# Patient Record
Sex: MALE | Race: WHITE | NOT HISPANIC OR LATINO | Employment: FULL TIME | URBAN - METROPOLITAN AREA
[De-identification: names, ages, dates, MRNs, and addresses within clinical notes are randomized per-mention and may not be internally consistent; named-entity substitution may affect disease eponyms.]

---

## 2019-05-23 PROBLEM — J45.20 MILD INTERMITTENT ASTHMA WITHOUT COMPLICATION: Status: ACTIVE | Noted: 2019-05-23

## 2022-09-15 ENCOUNTER — OFFICE VISIT (OUTPATIENT)
Dept: URGENT CARE | Facility: PHYSICIAN GROUP | Age: 32
End: 2022-09-15

## 2022-09-15 VITALS
WEIGHT: 180 LBS | HEART RATE: 63 BPM | HEIGHT: 69 IN | OXYGEN SATURATION: 98 % | SYSTOLIC BLOOD PRESSURE: 110 MMHG | RESPIRATION RATE: 16 BRPM | BODY MASS INDEX: 26.66 KG/M2 | DIASTOLIC BLOOD PRESSURE: 70 MMHG | TEMPERATURE: 97.8 F

## 2022-09-15 DIAGNOSIS — J98.8 VIRAL RESPIRATORY INFECTION: ICD-10-CM

## 2022-09-15 DIAGNOSIS — B97.89 VIRAL RESPIRATORY INFECTION: ICD-10-CM

## 2022-09-15 DIAGNOSIS — J45.901 ASTHMA EXACERBATION, MILD: ICD-10-CM

## 2022-09-15 DIAGNOSIS — K04.7 DENTAL INFECTION: ICD-10-CM

## 2022-09-15 PROCEDURE — 99203 OFFICE O/P NEW LOW 30 MIN: CPT | Performed by: NURSE PRACTITIONER

## 2022-09-15 RX ORDER — AMOXICILLIN AND CLAVULANATE POTASSIUM 875; 125 MG/1; MG/1
1 TABLET, FILM COATED ORAL 2 TIMES DAILY
Qty: 14 TABLET | Refills: 0 | Status: SHIPPED | OUTPATIENT
Start: 2022-09-15 | End: 2022-09-22

## 2022-09-15 RX ORDER — PREDNISONE 20 MG/1
40 TABLET ORAL DAILY
Qty: 10 TABLET | Refills: 0 | Status: SHIPPED | OUTPATIENT
Start: 2022-09-15 | End: 2022-09-20

## 2022-09-15 ASSESSMENT — ENCOUNTER SYMPTOMS
VOMITING: 0
FEVER: 0
COUGH: 1
HEMOPTYSIS: 0
SHORTNESS OF BREATH: 0
SINUS PRESSURE: 1
WHEEZING: 0
SORE THROAT: 1
DIARRHEA: 0
NAUSEA: 0
SINUS PAIN: 1

## 2022-09-15 NOTE — PATIENT INSTRUCTIONS
Symptomatic care.  -Oral hydration and rest.   -Cough control: nonpharmacologic options for cough relief such as throat lozenges, hot tea, honey.  -Over the counter expectorant as directed; Guaifenesin (Mucinex).  -Tylenol or ibuprofen for pain and fever as directed.   -Warm salt water gargles.  -OTC Throat lozenges or spray (Cepacol).    Seek emergency medical care immediately for: Trouble breathing, persistent pain or pressure in the chest, confusion, inability to wake or stay awake, bluish lips or face, persistent tachycardia (fast heart rate), prolonged dizziness, persistent high grade fevers. Follow up for prolonged cough, persistent wheezing, persistent throat pain, difficulty swallowing, persistent fevers, leg swelling, or any other concerns. Follow up with your Primary Care Provider.     -Dental Hygiene  -Reduce sugar-rich foods or beverages.     Follow up with dentist. Follow up urgently for worsening symptoms or increased signs of infection (redness, pus, increased pain, increased swelling or fever). Emergently for swelling to neck or throat, difficulty swallowing, difficulty opening mouth, shortness of breath.

## 2022-09-15 NOTE — PROGRESS NOTES
Subjective:     Tc Hidalgo is a 31 y.o. male who presents for Sinusitis (Per patient has sinus infection for 3 days now. Needs antibiotic and note for work. )      Sinus symptms x 3 days. Mild sore throat. Had COVID 3 months ago. Taking Dayquil and tylenol. Others in the household have URI symptoms, and have tested negative for COVID. Also has lower dental pain, left wisdom tooth. Concerned for increased risk for sinus infections with his dental issues. Has an inhaler for his asthma.    Sinusitis  This is a new problem. The current episode started in the past 7 days. There has been no fever. Associated symptoms include congestion, coughing, sinus pressure and a sore throat. Pertinent negatives include no ear pain or shortness of breath.   Dental Pain   The pain is at a severity of 3/10. Associated symptoms include sinus pressure. Pertinent negatives include no fever.     Past Medical History:   Diagnosis Date   • Asthma        History reviewed. No pertinent surgical history.    Social History     Socioeconomic History   • Marital status: Single     Spouse name: Not on file   • Number of children: Not on file   • Years of education: Not on file   • Highest education level: Not on file   Occupational History   • Not on file   Tobacco Use   • Smoking status: Every Day     Packs/day: 0.00     Types: Cigarettes   • Smokeless tobacco: Never   • Tobacco comments:     4cig/day    Vaping Use   • Vaping Use: Never used   Substance and Sexual Activity   • Alcohol use: Yes   • Drug use: Yes     Types: Marijuana, Inhaled     Comment: cocaine, marijuana   • Sexual activity: Not on file   Other Topics Concern   • Not on file   Social History Narrative   • Not on file     Social Determinants of Health     Financial Resource Strain: Not on file   Food Insecurity: Not on file   Transportation Needs: Not on file   Physical Activity: Not on file   Stress: Not on file   Social Connections: Not on file   Intimate Partner  "Violence: Not on file   Housing Stability: Not on file        History reviewed. No pertinent family history.     No Known Allergies    Review of Systems   Constitutional:  Positive for malaise/fatigue. Negative for fever.   HENT:  Positive for congestion, sinus pressure, sinus pain and sore throat. Negative for ear pain.    Respiratory:  Positive for cough. Negative for hemoptysis, shortness of breath and wheezing.    Gastrointestinal:  Negative for diarrhea, nausea and vomiting.   Endo/Heme/Allergies:  Positive for environmental allergies.   All other systems reviewed and are negative.     Objective:   /70   Pulse 63   Temp 36.6 °C (97.8 °F) (Temporal)   Resp 16   Ht 1.74 m (5' 8.5\")   Wt 81.6 kg (180 lb)   SpO2 98%   BMI 26.97 kg/m²     Physical Exam  Vitals reviewed.   Constitutional:       General: He is not in acute distress.     Appearance: He is well-developed. He is ill-appearing. He is not toxic-appearing.   HENT:      Head: Normocephalic and atraumatic.      Right Ear: Tympanic membrane, ear canal and external ear normal.      Left Ear: Tympanic membrane, ear canal and external ear normal.      Nose: Mucosal edema and congestion present.      Mouth/Throat:      Lips: Pink.      Mouth: Mucous membranes are moist.      Dentition: Abnormal dentition. Dental tenderness present.      Pharynx: Oropharynx is clear.      Comments: Fractured lower left molar. Gum TTP.   Eyes:      Conjunctiva/sclera: Conjunctivae normal.   Cardiovascular:      Rate and Rhythm: Normal rate.   Pulmonary:      Effort: Pulmonary effort is normal. No accessory muscle usage, prolonged expiration, respiratory distress or retractions.      Breath sounds: No stridor. Wheezing present. No decreased breath sounds, rhonchi or rales.   Musculoskeletal:         General: Normal range of motion.      Cervical back: Full passive range of motion without pain, normal range of motion and neck supple. No crepitus.   Skin:     General: Skin " is warm and dry.      Findings: No rash.   Neurological:      General: No focal deficit present.      Mental Status: He is alert and oriented to person, place, and time.      GCS: GCS eye subscore is 4. GCS verbal subscore is 5. GCS motor subscore is 6.   Psychiatric:         Mood and Affect: Mood normal.         Speech: Speech normal.         Behavior: Behavior normal.         Thought Content: Thought content normal.         Judgment: Judgment normal.       Assessment/Plan:   1. Viral respiratory infection    2. Asthma exacerbation, mild  - predniSONE (DELTASONE) 20 MG Tab; Take 2 Tablets by mouth every day for 5 days.  Dispense: 10 Tablet; Refill: 0    3. Dental infection  - amoxicillin-clavulanate (AUGMENTIN) 875-125 MG Tab; Take 1 Tablet by mouth 2 times a day for 7 days.  Dispense: 14 Tablet; Refill: 0  Symptomatic care.  -Oral hydration and rest.   -Cough control: nonpharmacologic options for cough relief such as throat lozenges, hot tea, honey.  -Over the counter expectorant as directed; Guaifenesin (Mucinex).  -Tylenol or ibuprofen for pain and fever as directed.   -Warm salt water gargles.  -OTC Throat lozenges or spray (Cepacol).    Seek emergency medical care immediately for: Trouble breathing, persistent pain or pressure in the chest, confusion, inability to wake or stay awake, bluish lips or face, persistent tachycardia (fast heart rate), prolonged dizziness, persistent high grade fevers. Follow up for prolonged cough, persistent wheezing, persistent throat pain, difficulty swallowing, persistent fevers, leg swelling, or any other concerns. Follow up with your Primary Care Provider.     -Dental Hygiene  -Reduce sugar-rich foods or beverages.     Follow up with dentist. Follow up urgently for worsening symptoms or increased signs of infection (redness, pus, increased pain, increased swelling or fever). Emergently for swelling to neck or throat, difficulty swallowing, difficulty opening mouth, shortness  of breath.    -Discussed viral etiology of URI symptoms, along with smoke contributing to asthma. Stable Vitals. Had COVID 3 months ago. Patient to f/u on home COVID test.     Differential diagnosis, natural history, supportive care, and indications for immediate follow-up discussed.

## 2022-09-15 NOTE — LETTER
September 15, 2022         Patient: Tc Hidalgo   YOB: 1990   Date of Visit: 9/15/2022           To Whom it May Concern:    Tc Hidalgo was seen in my clinic on 9/15/2022. He may return to work on 9/17/2022, and long as COVID test is negative.     If you have any questions or concerns, please don't hesitate to call.        Sincerely,           TINO Newsome.  Electronically Signed

## 2023-05-13 ENCOUNTER — OFFICE VISIT (OUTPATIENT)
Dept: URGENT CARE | Facility: PHYSICIAN GROUP | Age: 33
End: 2023-05-13
Payer: COMMERCIAL

## 2023-05-13 VITALS
SYSTOLIC BLOOD PRESSURE: 118 MMHG | WEIGHT: 180 LBS | OXYGEN SATURATION: 98 % | RESPIRATION RATE: 18 BRPM | TEMPERATURE: 98.1 F | HEART RATE: 76 BPM | HEIGHT: 69 IN | BODY MASS INDEX: 26.66 KG/M2 | DIASTOLIC BLOOD PRESSURE: 80 MMHG

## 2023-05-13 DIAGNOSIS — K08.89 TOOTH PAIN: ICD-10-CM

## 2023-05-13 PROCEDURE — 99213 OFFICE O/P EST LOW 20 MIN: CPT | Performed by: FAMILY MEDICINE

## 2023-05-13 PROCEDURE — 3074F SYST BP LT 130 MM HG: CPT | Performed by: FAMILY MEDICINE

## 2023-05-13 PROCEDURE — 3079F DIAST BP 80-89 MM HG: CPT | Performed by: FAMILY MEDICINE

## 2023-05-13 RX ORDER — IBUPROFEN 600 MG/1
600 TABLET ORAL EVERY 6 HOURS PRN
Qty: 30 TABLET | Refills: 0 | Status: SHIPPED | OUTPATIENT
Start: 2023-05-13 | End: 2023-05-23

## 2023-05-13 RX ORDER — AMOXICILLIN 875 MG/1
875 TABLET, COATED ORAL 2 TIMES DAILY
Qty: 20 TABLET | Refills: 0 | Status: SHIPPED | OUTPATIENT
Start: 2023-05-13 | End: 2023-05-23

## 2023-05-13 ASSESSMENT — ENCOUNTER SYMPTOMS: CONSTITUTIONAL NEGATIVE: 1

## 2023-05-13 NOTE — LETTER
[facility] [address] [id number]     May 13, 2023    Patient: Tc Hidalgo   YOB: 1990   Date of Visit: 5/13/2023       To Whom It May Concern:    Tc Hidalgo was seen and treated in our department on 5/13/2023. Please excuse Tc for the next two days for tooth pain.    Sincerely,     Lawrence Rodas M.D.

## 2023-05-13 NOTE — PROGRESS NOTES
"Subjective:   Tc Hidalgo is a 32 y.o. male who presents for Dental Injury (Scheduled for oral surgery on Tuesday . Tooth is swelling and broke in half, molar in left back . )      Dental Injury        Review of Systems   Constitutional: Negative.    HENT: Negative.          Tooth pain   Skin: Negative.        Medications, Allergies, and current problem list reviewed today in Epic.     Objective:     /80   Pulse 76   Temp 36.7 °C (98.1 °F) (Temporal)   Resp 18   Ht 1.753 m (5' 9\")   Wt 81.6 kg (180 lb)   SpO2 98%     Physical Exam  Vitals and nursing note reviewed.   Constitutional:       Appearance: Normal appearance.   HENT:      Head: Normocephalic and atraumatic.      Right Ear: Tympanic membrane normal.      Left Ear: Tympanic membrane normal.      Nose: Nose normal.      Mouth/Throat:      Comments: Broken teeth, with swollen, irritated gums  Cardiovascular:      Rate and Rhythm: Normal rate and regular rhythm.      Pulses: Normal pulses.      Heart sounds: Normal heart sounds.   Pulmonary:      Effort: Pulmonary effort is normal.      Breath sounds: Normal breath sounds.   Neurological:      Mental Status: He is alert.         Assessment/Plan:     Diagnosis and associated orders:     1. Tooth pain  amoxicillin (AMOXIL) 875 MG tablet    ibuprofen (MOTRIN) 600 MG Tab         Comments/MDM:              Differential diagnosis, natural history, supportive care, and indications for immediate follow-up discussed.    Advised the patient to follow-up with the primary care physician for recheck, reevaluation, and consideration of further management.    Please note that this dictation was created using voice recognition software. I have made a reasonable attempt to correct obvious errors, but I expect that there are errors of grammar and possibly content that I did not discover before finalizing the note.    This note was electronically signed by Lawrence Rodas M.D.  "

## 2023-10-05 ENCOUNTER — OFFICE VISIT (OUTPATIENT)
Dept: URGENT CARE | Facility: CLINIC | Age: 33
End: 2023-10-05
Payer: COMMERCIAL

## 2023-10-05 VITALS
SYSTOLIC BLOOD PRESSURE: 122 MMHG | OXYGEN SATURATION: 96 % | HEIGHT: 69 IN | TEMPERATURE: 98.3 F | BODY MASS INDEX: 26.12 KG/M2 | DIASTOLIC BLOOD PRESSURE: 82 MMHG | HEART RATE: 68 BPM | WEIGHT: 176.37 LBS | RESPIRATION RATE: 16 BRPM

## 2023-10-05 DIAGNOSIS — J45.21 MILD INTERMITTENT ASTHMA WITH EXACERBATION: ICD-10-CM

## 2023-10-05 PROCEDURE — 3079F DIAST BP 80-89 MM HG: CPT | Performed by: FAMILY MEDICINE

## 2023-10-05 PROCEDURE — 3074F SYST BP LT 130 MM HG: CPT | Performed by: FAMILY MEDICINE

## 2023-10-05 PROCEDURE — 99214 OFFICE O/P EST MOD 30 MIN: CPT | Performed by: FAMILY MEDICINE

## 2023-10-05 RX ORDER — IPRATROPIUM BROMIDE AND ALBUTEROL 20; 100 UG/1; UG/1
1 SPRAY, METERED RESPIRATORY (INHALATION) 4 TIMES DAILY
Qty: 1 EACH | Refills: 0 | Status: SHIPPED | OUTPATIENT
Start: 2023-10-05

## 2023-10-05 RX ORDER — PREDNISONE 20 MG/1
40 TABLET ORAL DAILY
Qty: 10 TABLET | Refills: 0 | Status: SHIPPED | OUTPATIENT
Start: 2023-10-05 | End: 2023-10-10

## 2023-10-05 RX ORDER — ALBUTEROL SULFATE 90 UG/1
2 AEROSOL, METERED RESPIRATORY (INHALATION) EVERY 4 HOURS PRN
Qty: 1 EACH | Refills: 0 | Status: SHIPPED | OUTPATIENT
Start: 2023-10-05 | End: 2023-10-05

## 2023-10-05 ASSESSMENT — ENCOUNTER SYMPTOMS
VOMITING: 0
EYE REDNESS: 0
EYE DISCHARGE: 0
MYALGIAS: 0
WEIGHT LOSS: 0
NAUSEA: 0

## 2023-10-05 NOTE — PROGRESS NOTES
"Subjective     Tc Hidalgo is a 32 y.o. male who presents with Asthma (Pt reports he had an asthma attack at work, however he has since recovered after receiving what he believes was Duoneb. )            Onset today shortness of breath and wheezing.  PMH asthma.  Triggered by environmental factors at work.  He is significantly proved after DuoNeb breathing treatment at his work.  No cough.  No fever.  No other aggravating alleviating factors.        Review of Systems   Constitutional:  Negative for malaise/fatigue and weight loss.   Eyes:  Negative for discharge and redness.   Gastrointestinal:  Negative for nausea and vomiting.   Musculoskeletal:  Negative for joint pain and myalgias.   Skin:  Negative for itching and rash.              Objective     /82   Pulse 68   Temp 36.8 °C (98.3 °F) (Temporal)   Resp 16   Ht 1.753 m (5' 9\")   Wt 80 kg (176 lb 5.9 oz)   SpO2 96%   BMI 26.05 kg/m²      Physical Exam  Constitutional:       General: He is not in acute distress.     Appearance: He is well-developed.   HENT:      Head: Normocephalic and atraumatic.   Eyes:      Conjunctiva/sclera: Conjunctivae normal.   Cardiovascular:      Rate and Rhythm: Normal rate and regular rhythm.      Heart sounds: Normal heart sounds. No murmur heard.  Pulmonary:      Effort: Pulmonary effort is normal.      Breath sounds: Normal breath sounds. No wheezing.   Skin:     General: Skin is warm and dry.      Findings: No rash.   Neurological:      Mental Status: He is alert.                             Assessment & Plan        1. Mild intermittent asthma with exacerbation    - ipratropium-albuterol (COMBIVENT RESPIMAT)  MCG/ACT Aero Soln; Inhale 1 Puff 4 times a day.  Dispense: 1 Each; Refill: 0  - albuterol 108 (90 Base) MCG/ACT Aero Soln inhalation aerosol; Inhale 2 Puffs every four hours as needed for Shortness of Breath.  Dispense: 1 Each; Refill: 0  - predniSONE (DELTASONE) 20 MG Tab; Take 2 Tablets by mouth " every day for 5 days.  Dispense: 10 Tablet; Refill: 0     Differential diagnosis, natural history, supportive care, and indications for immediate follow-up were discussed.

## 2023-10-05 NOTE — LETTER
October 5, 2023         Patient: Tc Hidalgo   YOB: 1990   Date of Visit: 10/5/2023           To Whom it May Concern:    Tc Hidalgo was seen in my clinic on 10/5/2023. Please excuse from work today.     Sincerely,           Jordin Sr M.D.  Electronically Signed

## 2023-10-11 ENCOUNTER — OCCUPATIONAL MEDICINE (OUTPATIENT)
Dept: URGENT CARE | Facility: PHYSICIAN GROUP | Age: 33
End: 2023-10-11
Payer: COMMERCIAL

## 2023-10-11 ENCOUNTER — OFFICE VISIT (OUTPATIENT)
Dept: URGENT CARE | Facility: PHYSICIAN GROUP | Age: 33
End: 2023-10-11
Payer: COMMERCIAL

## 2023-10-11 VITALS
BODY MASS INDEX: 26.19 KG/M2 | OXYGEN SATURATION: 95 % | RESPIRATION RATE: 16 BRPM | WEIGHT: 176.8 LBS | HEIGHT: 69 IN | DIASTOLIC BLOOD PRESSURE: 72 MMHG | TEMPERATURE: 98.2 F | HEART RATE: 61 BPM | SYSTOLIC BLOOD PRESSURE: 124 MMHG

## 2023-10-11 VITALS
BODY MASS INDEX: 26.14 KG/M2 | WEIGHT: 176.5 LBS | OXYGEN SATURATION: 95 % | SYSTOLIC BLOOD PRESSURE: 124 MMHG | HEIGHT: 69 IN | TEMPERATURE: 98.2 F | DIASTOLIC BLOOD PRESSURE: 72 MMHG | HEART RATE: 61 BPM | RESPIRATION RATE: 16 BRPM

## 2023-10-11 DIAGNOSIS — J45.41 MODERATE PERSISTENT ASTHMA WITH ACUTE EXACERBATION: ICD-10-CM

## 2023-10-11 PROCEDURE — 3074F SYST BP LT 130 MM HG: CPT | Performed by: NURSE PRACTITIONER

## 2023-10-11 PROCEDURE — 99214 OFFICE O/P EST MOD 30 MIN: CPT | Performed by: NURSE PRACTITIONER

## 2023-10-11 PROCEDURE — 3078F DIAST BP <80 MM HG: CPT | Performed by: NURSE PRACTITIONER

## 2023-10-11 RX ORDER — BUDESONIDE AND FORMOTEROL FUMARATE DIHYDRATE 80; 4.5 UG/1; UG/1
2 AEROSOL RESPIRATORY (INHALATION) 2 TIMES DAILY
Qty: 1 EACH | Refills: 0 | Status: SHIPPED | OUTPATIENT
Start: 2023-10-11

## 2023-10-11 RX ORDER — PREDNISONE 20 MG/1
40 TABLET ORAL DAILY
Qty: 10 TABLET | Refills: 0 | Status: SHIPPED | OUTPATIENT
Start: 2023-10-11 | End: 2023-10-16

## 2023-10-11 NOTE — PROGRESS NOTES
"Subjective     Tc Hidalgo is a 32 y.o. male who presents with Medication Problem (Pt was prescribed Combivent Respimat for daily use following an asthma attack. Pt reports difficulty breathing upon waking in the morning, unless this inhaler is used immediately. ) and Breathing Problem (Pt states his lungs feel like they're \"sticking\".)            HPI   last week he had an asthma attack at work. He went to [facility] and they gave him a breathing treatment. Usually he uses \"normal' kind but they added ipitropium bromide to breathing treatment. He was seen at [facility] and he was given combivent. It is is causing him constriction in his lungs. He has not used it today. It helps but when uses his lungs are worse.   He smokes cigarettes daily. Pt with asthma his entire life.     ROS           Objective     /72 (BP Location: Left arm, Patient Position: Sitting, BP Cuff Size: Adult long)   Pulse 61   Temp 36.8 °C (98.2 °F) (Temporal)   Resp 16   Ht 1.753 m (5' 9\")   Wt 80.1 kg (176 lb 8 oz)   SpO2 95%   BMI 26.06 kg/m²      Physical Exam                        Assessment & Plan        There are no diagnoses linked to this encounter.                "

## 2023-10-11 NOTE — LETTER
October 11, 2023       Patient: Tc Hidalgo   YOB: 1990   Date of Visit: 10/11/2023         To Whom It May Concern:    Tc Hidalgo was seen in urgent care for a medical problem today. Please excuse his work absence.           Sincerely,          THAO Jane.  Electronically Signed

## 2023-10-12 NOTE — PROGRESS NOTES
"Tc Hidalgo is a 32 y.o. male who presents for Medication Problem (Pt was prescribed Combivant Respimat for daily use following asthma attack. Pt reports difficulty breathing upon waking, unless the inhaler is used immediately. ) and Breathing Problem (Pt states his lungs feel like they're \"sticking\". )      HPI  This is a new problem. Tc Hidalgo is a 32 y.o. patient who presents to urgent care with c/o: Patient has a long history of asthma.  While at work the other day he threw away an alcohol wipe.  Shortly after that he developed an asthma attack that was triggered by environmental factors at his work.  He had been struggling with his asthma for a few days prior.  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  He reports he is never had that before.  He normally has albuterol when he has an asthma attack which alleviates his discomfort well.  Then he went to urgent care on October 5.  He was treated with prednisone as well as given a Combivent Respimat.  At first the Combivent seem to be helping him with his asthma was really bad but now he reports that he actually feels that his asthma is worse while using it.  Today he feels much better not using the Combivent.  He says he has a new prescription at his pharmacy for albuterol that he has to go .  He has concerns about what he should be taking to help his asthma and get it back under control.  He does smoke tobacco daily and knows that he needs to stop smoking.  He has cut down since he has been sick.  He denies cough, orthopnea, chest pain.  He has had no recent illness.  He has not been on any recent antibiotic.        ROS See HPI    Allergies:     No Known Allergies    PMSFS Hx:  Past Medical History:   Diagnosis Date    Asthma      History reviewed. No pertinent surgical history.  History reviewed. No pertinent family history.  Social History     Tobacco Use    Smoking " "status: Every Day     Current packs/day: 0.75     Types: Cigarettes    Smokeless tobacco: Never    Tobacco comments:     4cig/day    Substance Use Topics    Alcohol use: Yes       Problems:   Patient Active Problem List   Diagnosis    Mild intermittent asthma without complication       Medications:   Current Outpatient Medications on File Prior to Visit   Medication Sig Dispense Refill    ipratropium-albuterol (COMBIVENT RESPIMAT)  MCG/ACT Aero Soln Inhale 1 Puff 4 times a day. 1 Each 0    albuterol 108 (90 Base) MCG/ACT Aero Soln inhalation aerosol Inhale 2 Puffs by mouth every 6 hours as needed for Shortness of Breath. 8.5 g 0     No current facility-administered medications on file prior to visit.          Objective:     /72 (BP Location: Left arm, Patient Position: Sitting, BP Cuff Size: Adult long)   Pulse 61   Temp 36.8 °C (98.2 °F) (Temporal)   Resp 16   Ht 1.753 m (5' 9\")   Wt 80.2 kg (176 lb 12.8 oz)   SpO2 95%   BMI 26.11 kg/m²     Physical Exam  Vitals and nursing note reviewed.   Constitutional:       General: He is not in acute distress.     Appearance: Normal appearance. He is well-developed and well-groomed. He is not toxic-appearing.   Eyes:      General:         Right eye: No discharge.         Left eye: No discharge.   Cardiovascular:      Rate and Rhythm: Normal rate and regular rhythm.      Pulses: Normal pulses.      Heart sounds: Normal heart sounds.   Pulmonary:      Effort: Pulmonary effort is normal. No accessory muscle usage.      Breath sounds: Normal air entry. No stridor. Wheezing present. No rhonchi.      Comments: Speaking in full complete sentences.  Musculoskeletal:      Cervical back: Neck supple.   Lymphadenopathy:      Cervical: No cervical adenopathy.      Upper Body:      Right upper body: No supraclavicular adenopathy.      Left upper body: No supraclavicular adenopathy.   Skin:     General: Skin is warm and dry.      Capillary Refill: Capillary refill takes " less than 2 seconds.   Neurological:      Mental Status: He is alert and oriented to person, place, and time.   Psychiatric:         Mood and Affect: Mood normal.         Behavior: Behavior normal.         Thought Content: Thought content normal.         Judgment: Judgment normal.           Assessment /Associated Orders:      1. Moderate persistent asthma with acute exacerbation  predniSONE (DELTASONE) 20 MG Tab    budesonide-formoterol (SYMBICORT) 80-4.5 MCG/ACT Aerosol          Medical Decision Making:    Tc is a very pleasant 32 y.o. male who is clinically stable at today's acute urgent care visit.  No acute distress noted.  VSS. Appropriate for outpatient care at this time.   Acute problem today with uncertain prognosis.       Educated in proper administration of  prescription medication(s) ordered today including safety, possible SE, risks, benefits, rationale and alternatives to therapy.   Declines albuterol nebulized tx today   Start prednisone tomorrow morning. Start symbicort tonight  Do not take additional NSAIDS or steroids while taking RX medication. Educated on risk of lower immunity in short term, weight changes, mood changes, increased serum glucose and elevated BP while taking steroids.   Keep well hydrated  Tobacco Use: High Risk (10/11/2023)    Patient History     Smoking Tobacco Use: Every Day     Smokeless Tobacco Use: Never     Passive Exposure: Not on file   Educated on  health risks  associated with tobacco, nicotine, and vaping products. Verbalized understanding of health risks. Currently using tobacco products daily.   Declines referral to tobacco cessation program. ([phone]) tobacco cessation number given to pt to assist with quitting if desired.   Recommend FU with PCP for further management of smoking cessation if desired.     Discussed Dx, management options (risks,benefits, and alternatives to planned treatment), natural progression and supportive care.  Expressed  understanding and the treatment plan was agreed upon.   Questions were encouraged and answered   Return to urgent care prn if new or worsening sx or if there is no improvement in condition prn.    Educated in Red flags and indications to immediately call 911 or present to the Emergency Department.             Please note that this dictation was created using voice recognition software. I have worked with consultants from the vendor as well as technical experts from [facility] to optimize the interface. I have made every reasonable attempt to correct obvious errors, but I expect that there are errors of grammar and possibly content that I did not discover before finalizing the note.  This note was electronically signed by provider

## 2023-10-28 ENCOUNTER — OFFICE VISIT (OUTPATIENT)
Dept: URGENT CARE | Facility: PHYSICIAN GROUP | Age: 33
End: 2023-10-28
Payer: COMMERCIAL

## 2023-10-28 VITALS
TEMPERATURE: 98.1 F | SYSTOLIC BLOOD PRESSURE: 118 MMHG | RESPIRATION RATE: 16 BRPM | BODY MASS INDEX: 26.07 KG/M2 | HEIGHT: 69 IN | WEIGHT: 176 LBS | HEART RATE: 71 BPM | DIASTOLIC BLOOD PRESSURE: 64 MMHG | OXYGEN SATURATION: 94 %

## 2023-10-28 DIAGNOSIS — R05.1 ACUTE COUGH: ICD-10-CM

## 2023-10-28 DIAGNOSIS — Z76.89 ENCOUNTER TO ESTABLISH CARE WITH NEW DOCTOR: ICD-10-CM

## 2023-10-28 DIAGNOSIS — J45.41 MODERATE PERSISTENT ASTHMA WITH ACUTE EXACERBATION: ICD-10-CM

## 2023-10-28 PROCEDURE — 3074F SYST BP LT 130 MM HG: CPT

## 2023-10-28 PROCEDURE — 3078F DIAST BP <80 MM HG: CPT

## 2023-10-28 PROCEDURE — 99214 OFFICE O/P EST MOD 30 MIN: CPT

## 2023-10-28 RX ORDER — DEXTROMETHORPHAN HYDROBROMIDE AND PROMETHAZINE HYDROCHLORIDE 15; 6.25 MG/5ML; MG/5ML
5 SYRUP ORAL EVERY 6 HOURS PRN
Qty: 100 ML | Refills: 0 | Status: SHIPPED | OUTPATIENT
Start: 2023-10-28

## 2023-10-28 RX ORDER — BENZONATATE 100 MG/1
100 CAPSULE ORAL 3 TIMES DAILY PRN
Qty: 30 CAPSULE | Refills: 0 | Status: SHIPPED | OUTPATIENT
Start: 2023-10-28 | End: 2023-11-07

## 2023-10-28 NOTE — LETTER
October 28, 2023    To Whom It May Concern:         This is confirmation that Tc Gastonlon Rocky attended his scheduled appointment with URSULA Cordero on 10/28/23.  Pt released back to work 11/2/23         If you have any questions please do not hesitate to call me at the phone number listed below.    Sincerely,          TINO Cordero.  [phone]

## 2023-10-28 NOTE — PROGRESS NOTES
"Chief Complaint   Patient presents with    Shortness of Breath     Hx of asthma ,coughing spells ,draining is energy x 1 week          Subjective:   HISTORY OF PRESENT ILLNESS: Tc Hidalgo is a 33 y.o. male who presents for cough, fatigue x 1 week.  History of asthma.  He reports extreme exhaustion.  Patient denies SOB, wheezing, diff breathing.  He is using his albuterol inhaler as prescribed        Medications, Allergies, current problem list, Social and Family history reviewed today in Epic.     Objective:     /64   Pulse 71   Temp 36.7 °C (98.1 °F) (Temporal)   Resp 16   Ht 1.753 m (5' 9\")   Wt 79.8 kg (176 lb)   SpO2 94%     Physical Exam  Vitals reviewed.   Constitutional:       Appearance: Normal appearance. He is not toxic-appearing.   HENT:      Head:      Jaw: No trismus.      Right Ear: Tympanic membrane normal.      Left Ear: Tympanic membrane normal.      Nose: Rhinorrhea present.      Mouth/Throat:      Mouth: Mucous membranes are moist.      Pharynx: Uvula midline. Posterior oropharyngeal erythema present. No pharyngeal swelling, oropharyngeal exudate or uvula swelling.      Tonsils: No tonsillar exudate or tonsillar abscesses. 1+ on the right. 1+ on the left.   Eyes:      Conjunctiva/sclera: Conjunctivae normal.   Cardiovascular:      Rate and Rhythm: Normal rate and regular rhythm.      Heart sounds: Normal heart sounds.   Pulmonary:      Effort: Pulmonary effort is normal. No respiratory distress.      Breath sounds: Normal breath sounds. No stridor. No decreased breath sounds, wheezing or rhonchi.   Musculoskeletal:      Cervical back: Full passive range of motion without pain and neck supple.   Skin:     General: Skin is warm and dry.   Neurological:      Mental Status: He is alert and oriented to person, place, and time.   Psychiatric:         Mood and Affect: Mood normal.          Assessment/Plan:     Diagnosis and associated orders    I personally reviewed prior external " notes and test results pertinent to today's visit.     1. Acute cough  benzonatate (TESSALON) 100 MG Cap    promethazine-dextromethorphan (PROMETHAZINE-DM) 6.25-15 MG/5ML syrup      2. Moderate persistent asthma with acute exacerbation  Referral to establish with [facility]CP      3. Encounter to establish care with new doctor  Referral to establish with [facility]CP            IMPRESSION:  Exam findings reassuring with stable vital signs, No red flag symptoms or exam findings.  Patient reports cough and extreme fatigue.  He denies any shortness of breath at this time.  He reports that he does not feel that he needs a breathing treatment in clinic today.  He reports that he has a prescription for 5-day course of prednisone that he did not take from his last visit.  I have advised him to monitor his symptoms for the next 24 to 48 hours and if they are not improving he can start the course of prednisone.  I have given him to cough medicines and a note for work.  Strict ED precautions discussed.    Differential diagnosis discussed. Pt was Educated on red flag symptoms. Pt has been Instructed to return to Urgent Care or nearest Emergency Department if symptoms fail to improve, for any change in condition, further concerns, or new concerning symptoms. Patient states understanding of the plan of care and discharge instructions.  They are discharged in stable condition.         Please note that this dictation was created using voice recognition software. I have made a reasonable attempt to correct obvious errors, but I expect that there are errors of grammar and possibly content that I did not discover before finalizing the note.    This note was electronically signed by URSULA Cordero

## 2023-11-06 ENCOUNTER — HOSPITAL ENCOUNTER (OUTPATIENT)
Dept: RADIOLOGY | Facility: MEDICAL CENTER | Age: 33
End: 2023-11-06
Payer: COMMERCIAL

## 2023-11-06 ENCOUNTER — OFFICE VISIT (OUTPATIENT)
Dept: URGENT CARE | Facility: PHYSICIAN GROUP | Age: 33
End: 2023-11-06
Payer: COMMERCIAL

## 2023-11-06 VITALS
DIASTOLIC BLOOD PRESSURE: 80 MMHG | RESPIRATION RATE: 16 BRPM | SYSTOLIC BLOOD PRESSURE: 110 MMHG | HEIGHT: 69 IN | HEART RATE: 86 BPM | BODY MASS INDEX: 26.22 KG/M2 | WEIGHT: 177 LBS | OXYGEN SATURATION: 99 % | TEMPERATURE: 97.7 F

## 2023-11-06 DIAGNOSIS — R07.89 CHEST WALL PAIN: ICD-10-CM

## 2023-11-06 DIAGNOSIS — R05.2 SUBACUTE COUGH: ICD-10-CM

## 2023-11-06 PROCEDURE — 3074F SYST BP LT 130 MM HG: CPT

## 2023-11-06 PROCEDURE — 99213 OFFICE O/P EST LOW 20 MIN: CPT | Mod: 25

## 2023-11-06 PROCEDURE — 3079F DIAST BP 80-89 MM HG: CPT

## 2023-11-06 PROCEDURE — 71101 X-RAY EXAM UNILAT RIBS/CHEST: CPT | Mod: RT

## 2023-11-06 RX ORDER — KETOROLAC TROMETHAMINE 30 MG/ML
15 INJECTION, SOLUTION INTRAMUSCULAR; INTRAVENOUS ONCE
Status: COMPLETED | OUTPATIENT
Start: 2023-11-06 | End: 2023-11-06

## 2023-11-06 RX ORDER — AZITHROMYCIN 250 MG/1
250 TABLET, FILM COATED ORAL DAILY
Qty: 6 TABLET | Refills: 0 | Status: SHIPPED | OUTPATIENT
Start: 2023-11-06 | End: 2023-11-12

## 2023-11-06 RX ADMIN — KETOROLAC TROMETHAMINE 15 MG: 30 INJECTION, SOLUTION INTRAMUSCULAR; INTRAVENOUS at 12:35

## 2023-11-06 NOTE — PROGRESS NOTES
"Chief Complaint   Patient presents with    Other     C/O side pain with coughing. Asking for pain medication.          Subjective:   HISTORY OF PRESENT ILLNESS: Tc Hidalgo is a 33 y.o. male who presents for right rib pain after a coughing fit.  He reports chronic cough due to asthma, recent URI.  He reports he no longer has the exhaustion and no longer feels sick but still has a cough and coughed very forcefully causing him to have sudden onset of right sided rib pain 2 nights ago.  Since then he has very bad pain whenever he moves, coughs or laughs   Patient denies recent fevers, SOB or wheezing.  States that he only completed 2 days of his steroids because they do make him feel aggravated and he doesn't sleep well    Medications, Allergies, current problem list, Social and Family history reviewed today in Epic.     Objective:     /80 (BP Location: Left arm, Patient Position: Sitting, BP Cuff Size: Adult long)   Pulse 86   Temp 36.5 °C (97.7 °F) (Temporal)   Resp 16   Ht 1.753 m (5' 9\")   Wt 80.3 kg (177 lb)   SpO2 99%     Physical Exam  Vitals reviewed.   Constitutional:       Appearance: Normal appearance.   HENT:      Mouth/Throat:      Mouth: Mucous membranes are moist.   Cardiovascular:      Rate and Rhythm: Normal rate.   Pulmonary:      Effort: Pulmonary effort is normal.   Chest:      Chest wall: Tenderness present. No deformity, swelling, crepitus or edema.      Comments: TTP to right lower ribs on anterior chest wall  Skin:     General: Skin is warm and dry.   Neurological:      Mental Status: He is alert and oriented to person, place, and time.   Psychiatric:         Mood and Affect: Mood normal.          Assessment/Plan:     Diagnosis and associated orders    I personally reviewed prior external notes and test results pertinent to today's visit.     1. Chest wall pain  [id number] (WITH 1-VIEW CXR) RIGHT    ketorolac (Toradol) injection 15 mg    azithromycin (ZITHROMAX) 250 " MG Tab      2. Subacute cough  azithromycin (ZITHROMAX) 250 MG Tab        Today's radiology imaging personally reviewed by me today on day of visit and Radiology readings reviewed and discussed w/ patient today.     RADIOLOGY RESULTS   [id number] (WITH 1-VIEW CXR) RIGHT    Result Date: 11/6/2023 11/6/2023 12:44 PM HISTORY/REASON FOR EXAM:  Cough; with rib pain and diminished breath sounds. TECHNIQUE/EXAM DESCRIPTION AND NUMBER OF VIEWS:  5 images of the right ribs and chest. COMPARISON: NONE FINDINGS: No displaced fractures or acute bony changes are noted. No airspace opacity or consolidation. There is no evidence of a hemo or pneumothorax. Normal cardiopericardial silhouette.     No displaced rib fracture.           IMPRESSION:  Exam findings reassuring with stable vital signs, No red flag symptoms or exam findings. Exam and history Consistent with costochondritis.  Ordered xray to R/O rib fx and PNA.  Advised him to take full course of steriods if able. Splint right side when coughing or standing.  Start ibuprofen tomorrow 600mg BID until resolution of symptoms. Contingent antibiotic prescription given to patient to fill upon meeting criteria of guidelines discussed. Work note provided      Differential diagnosis discussed. Pt was Educated on red flag symptoms. Pt has been Instructed to return to Urgent Care or nearest Emergency Department if symptoms fail to improve, for any change in condition, further concerns, or new concerning symptoms. Patient states understanding of the plan of care and discharge instructions.  They are discharged in stable condition.         Please note that this dictation was created using voice recognition software. I have made a reasonable attempt to correct obvious errors, but I expect that there are errors of grammar and possibly content that I did not discover before finalizing the note.    This note was electronically signed by URSULA Cordero

## 2023-11-06 NOTE — LETTER
November 6, 2023    To Whom It May Concern:         This is confirmation that Tc Everton Hidalgo attended his scheduled appointment with URSULA Cordero on 11/06/23. May return 11/11/23         If you have any questions please do not hesitate to call me at the phone number listed below.    Sincerely,          TINO Cordero.  [phone]

## 2023-11-07 ENCOUNTER — TELEPHONE (OUTPATIENT)
Dept: HEALTH INFORMATION MANAGEMENT | Facility: OTHER | Age: 33
End: 2023-11-07

## 2024-04-02 ENCOUNTER — OFFICE VISIT (OUTPATIENT)
Dept: URGENT CARE | Facility: PHYSICIAN GROUP | Age: 34
End: 2024-04-02
Payer: COMMERCIAL

## 2024-04-02 VITALS
BODY MASS INDEX: 27.17 KG/M2 | HEART RATE: 75 BPM | OXYGEN SATURATION: 95 % | DIASTOLIC BLOOD PRESSURE: 84 MMHG | HEIGHT: 69 IN | SYSTOLIC BLOOD PRESSURE: 112 MMHG | WEIGHT: 183.42 LBS | RESPIRATION RATE: 22 BRPM | TEMPERATURE: 97.3 F

## 2024-04-02 DIAGNOSIS — J06.9 VIRAL URI WITH COUGH: ICD-10-CM

## 2024-04-02 DIAGNOSIS — J02.9 PHARYNGITIS, UNSPECIFIED ETIOLOGY: ICD-10-CM

## 2024-04-02 PROCEDURE — 3079F DIAST BP 80-89 MM HG: CPT

## 2024-04-02 PROCEDURE — 87651 STREP A DNA AMP PROBE: CPT

## 2024-04-02 PROCEDURE — 3074F SYST BP LT 130 MM HG: CPT

## 2024-04-02 PROCEDURE — 99213 OFFICE O/P EST LOW 20 MIN: CPT

## 2024-04-02 RX ORDER — BENZONATATE 100 MG/1
100 CAPSULE ORAL 3 TIMES DAILY PRN
Qty: 60 CAPSULE | Refills: 0 | Status: SHIPPED | OUTPATIENT
Start: 2024-04-02

## 2024-04-02 RX ORDER — METHYLPREDNISOLONE 4 MG/1
TABLET ORAL
Qty: 21 TABLET | Refills: 0 | Status: SHIPPED | OUTPATIENT
Start: 2024-04-02

## 2024-04-02 ASSESSMENT — ENCOUNTER SYMPTOMS
FEVER: 0
WHEEZING: 1
SORE THROAT: 1
COUGH: 1
SHORTNESS OF BREATH: 1
SPUTUM PRODUCTION: 1

## 2024-04-03 LAB — S PYO DNA SPEC NAA+PROBE: NOT DETECTED

## 2024-04-03 NOTE — PROGRESS NOTES
Subjective:     CHIEF COMPLAINT  Chief Complaint   Patient presents with    Other     Has chronic asthma, sob and cough x 4 days. No appetite       HPI  Tc Hidalgo is a very pleasant 33 y.o. male who presents with a cough productive of thick phlegm, shortness of breath, and sore throat.  He reports that the symptoms have been present for the past 4 days.  He does have a history of moderate persistent asthma and is followed by pulmonology.  He reports that he has been using his inhalers as prescribed.  He started a leftover prescription of amoxicillin on day 2 of his illness without a change in symptoms.  He reports that he has had extremely thick mucus in his throat and has felt as if he has a mucous plug trapped in his throat.  He states that when he is able to express the mucus it is brown and thick.  He has not had any fevers.  He does report that he currently smokes tobacco but is planning to quit.    REVIEW OF SYSTEMS  Review of Systems   Constitutional:  Negative for fever and malaise/fatigue.   HENT:  Positive for congestion (Mild) and sore throat.    Respiratory:  Positive for cough, sputum production, shortness of breath and wheezing.        PAST MEDICAL HISTORY  Patient Active Problem List    Diagnosis Date Noted    Mild intermittent asthma without complication 05/23/2019       SURGICAL HISTORY  patient denies any surgical history    ALLERGIES  No Known Allergies    CURRENT MEDICATIONS  Home Medications       Reviewed by TENA Small-C. (Physician Assistant) on 04/02/24 at 1906  Med List Status: <None>     Medication Last Dose Status   albuterol 108 (90 Base) MCG/ACT Aero Soln inhalation aerosol Taking Active   budesonide-formoterol (SYMBICORT) 80-4.5 MCG/ACT Aerosol  Active   ipratropium-albuterol (COMBIVENT RESPIMAT)  MCG/ACT Aero Soln  Active   promethazine-dextromethorphan (PROMETHAZINE-DM) 6.25-15 MG/5ML syrup  Active                    SOCIAL HISTORY  Social History  "    Tobacco Use    Smoking status: Every Day     Current packs/day: 0.75     Types: Cigarettes     Passive exposure: Past    Smokeless tobacco: Never    Tobacco comments:     4cig/day    Vaping Use    Vaping Use: Never used   Substance and Sexual Activity    Alcohol use: Yes     Alcohol/week: 2.4 oz     Types: 4 Standard drinks or equivalent per week    Drug use: Yes     Types: Marijuana, Inhaled     Comment: previous cocaine user    Sexual activity: Not on file       FAMILY HISTORY  History reviewed. No pertinent family history.       Objective:     VITAL SIGNS: /84 (BP Location: Left arm, Patient Position: Sitting, BP Cuff Size: Adult long)   Pulse 75   Temp 36.3 °C (97.3 °F) (Temporal)   Resp (!) 22   Ht 1.753 m (5' 9\")   Wt 83.2 kg (183 lb 6.8 oz)   SpO2 95%   BMI 27.09 kg/m²     PHYSICAL EXAM  Physical Exam  Vitals reviewed. Exam conducted with a chaperone present.   Constitutional:       General: He is not in acute distress.     Appearance: Normal appearance. He is normal weight. He is ill-appearing. He is not toxic-appearing or diaphoretic.   HENT:      Head: Normocephalic and atraumatic.      Right Ear: Tympanic membrane, ear canal and external ear normal.      Left Ear: Tympanic membrane, ear canal and external ear normal.      Nose: Congestion present.      Mouth/Throat:      Mouth: Mucous membranes are moist.      Pharynx: Posterior oropharyngeal erythema present. No oropharyngeal exudate.      Comments: Uvula midline. Postnasal drip present  Eyes:      Conjunctiva/sclera: Conjunctivae normal.   Cardiovascular:      Rate and Rhythm: Normal rate and regular rhythm.      Heart sounds: Normal heart sounds.   Pulmonary:      Effort: Pulmonary effort is normal. No respiratory distress.      Breath sounds: Normal breath sounds. No stridor. No wheezing, rhonchi or rales.   Lymphadenopathy:      Cervical: No cervical adenopathy.   Skin:     General: Skin is warm and dry.      Coloration: Skin is not " pale.   Neurological:      General: No focal deficit present.      Mental Status: He is alert.   Psychiatric:         Mood and Affect: Mood normal.         Assessment/Plan:     1. Viral URI with cough  - methylPREDNISolone (MEDROL DOSEPAK) 4 MG Tablet Therapy Pack; Follow schedule on package instructions.  Dispense: 21 Tablet; Refill: 0  - benzonatate (TESSALON) 100 MG Cap; Take 1 Capsule by mouth 3 times a day as needed for Cough.  Dispense: 60 Capsule; Refill: 0    2. Pharyngitis, unspecified etiology  - POCT CEPHEID GROUP A STREP - PCR  -Continue use of inhalers   -Discontinue amoxicillin  -Sinus flushes using distilled water and saline packets  -Flonase nasal spray for congestion  -Follow up with pulmonology  -Follow up with PCP  -Return to clinic if symptoms worsen or fail to resolve    MDM/Comments:  Patient has stable vital signs and is non-toxic appearing.  Patient has been experiencing cough productive of thick phlegm.  His lungs were clear to auscultation bilaterally with a pulse oxygen of 95% on room air.  Discussed continued use of albuterol and Symbicort.  Patient provided a Medrol Dosepak for inflammation as well as benzonatate for cough control.  Patient has politely declined viral testing for COVID, influenza, and RSV.  Strep testing was performed in the office with negative results.  Discussed use of saline nasal flushes as well as gargling with salt water to help with postnasal drip.  Additionally, patient was provided counseling regarding smoking cessation and the importance of discontinuing smoking as soon as possible.  Discussed supportive care with hydration, rest, Tylenol/Ibuprofen as needed. Patient is stable and safe to be managed outpatient. Patient demonstrated understanding of treatment plan at this time and will RTC if symptoms worsen or fail to resolve.     Differential diagnosis, natural history, supportive care, and indications for immediate follow-up discussed. All questions  answered. Patient agrees with the plan of care.    Follow-up as needed if symptoms worsen or fail to improve to PCP, Urgent care or Emergency Room.    I have personally reviewed prior external notes and test results pertinent to today's visit.  I have independently reviewed and interpreted all diagnostics ordered during this urgent care acute visit.   Discussed management options (risks,benefits, and alternatives to treatment). Pt expresses understanding and the treatment plan was agreed upon. Questions were encouraged and answered to pt's satisfaction.    Please note that this dictation was created using voice recognition software. I have made a reasonable attempt to correct obvious errors, but I expect that there are errors of grammar and possibly content that I did not discover before finalizing the note.

## 2024-05-10 ENCOUNTER — HOSPITAL ENCOUNTER (OUTPATIENT)
Dept: LAB | Facility: MEDICAL CENTER | Age: 34
End: 2024-05-10
Attending: INTERNAL MEDICINE
Payer: COMMERCIAL

## 2024-05-10 LAB
BASOPHILS # BLD AUTO: 0.4 % (ref 0–1.8)
BASOPHILS # BLD: 0.04 K/UL (ref 0–0.12)
EOSINOPHIL # BLD AUTO: 0.22 K/UL (ref 0–0.51)
EOSINOPHIL NFR BLD: 2.3 % (ref 0–6.9)
ERYTHROCYTE [DISTWIDTH] IN BLOOD BY AUTOMATED COUNT: 42.5 FL (ref 35.9–50)
HCT VFR BLD AUTO: 51.5 % (ref 42–52)
HGB BLD-MCNC: 17.6 G/DL (ref 14–18)
IMM GRANULOCYTES # BLD AUTO: 0.05 K/UL (ref 0–0.11)
IMM GRANULOCYTES NFR BLD AUTO: 0.5 % (ref 0–0.9)
LYMPHOCYTES # BLD AUTO: 2.74 K/UL (ref 1–4.8)
LYMPHOCYTES NFR BLD: 28.8 % (ref 22–41)
MCH RBC QN AUTO: 31.9 PG (ref 27–33)
MCHC RBC AUTO-ENTMCNC: 34.2 G/DL (ref 32.3–36.5)
MCV RBC AUTO: 93.3 FL (ref 81.4–97.8)
MONOCYTES # BLD AUTO: 0.54 K/UL (ref 0–0.85)
MONOCYTES NFR BLD AUTO: 5.7 % (ref 0–13.4)
NEUTROPHILS # BLD AUTO: 5.94 K/UL (ref 1.82–7.42)
NEUTROPHILS NFR BLD: 62.3 % (ref 44–72)
NRBC # BLD AUTO: 0 K/UL
NRBC BLD-RTO: 0 /100 WBC (ref 0–0.2)
PLATELET # BLD AUTO: 251 K/UL (ref 164–446)
PMV BLD AUTO: 11.6 FL (ref 9–12.9)
RBC # BLD AUTO: 5.52 M/UL (ref 4.7–6.1)
WBC # BLD AUTO: 9.5 K/UL (ref 4.8–10.8)

## 2024-05-11 ENCOUNTER — APPOINTMENT (OUTPATIENT)
Dept: RADIOLOGY | Facility: MEDICAL CENTER | Age: 34
End: 2024-05-11
Attending: NURSE PRACTITIONER
Payer: COMMERCIAL

## 2024-05-11 ENCOUNTER — HOSPITAL ENCOUNTER (OUTPATIENT)
Dept: RADIOLOGY | Facility: MEDICAL CENTER | Age: 34
End: 2024-05-11
Attending: INTERNAL MEDICINE
Payer: COMMERCIAL

## 2024-05-11 DIAGNOSIS — J45.909 UNCOMPLICATED ASTHMA, UNSPECIFIED ASTHMA SEVERITY, UNSPECIFIED WHETHER PERSISTENT: ICD-10-CM

## 2024-05-13 LAB — IGE SERPL-ACNC: 213 KU/L

## 2025-06-10 ENCOUNTER — HOSPITAL ENCOUNTER (OUTPATIENT)
Dept: LAB | Facility: MEDICAL CENTER | Age: 35
End: 2025-06-10
Attending: PHYSICIAN ASSISTANT
Payer: COMMERCIAL

## 2025-06-10 LAB
ERYTHROCYTE [DISTWIDTH] IN BLOOD BY AUTOMATED COUNT: 41.3 FL (ref 35.9–50)
HCT VFR BLD AUTO: 49 % (ref 42–52)
HGB BLD-MCNC: 16.7 G/DL (ref 14–18)
MCH RBC QN AUTO: 31.7 PG (ref 27–33)
MCHC RBC AUTO-ENTMCNC: 34.1 G/DL (ref 32.3–36.5)
MCV RBC AUTO: 93.2 FL (ref 81.4–97.8)
PLATELET # BLD AUTO: 243 K/UL (ref 164–446)
PMV BLD AUTO: 11.6 FL (ref 9–12.9)
RBC # BLD AUTO: 5.26 M/UL (ref 4.7–6.1)
WBC # BLD AUTO: 9.3 K/UL (ref 4.8–10.8)

## 2025-06-10 PROCEDURE — 84439 ASSAY OF FREE THYROXINE: CPT

## 2025-06-10 PROCEDURE — 36415 COLL VENOUS BLD VENIPUNCTURE: CPT

## 2025-06-10 PROCEDURE — 85027 COMPLETE CBC AUTOMATED: CPT

## 2025-06-10 PROCEDURE — 84443 ASSAY THYROID STIM HORMONE: CPT

## 2025-06-10 PROCEDURE — 80053 COMPREHEN METABOLIC PANEL: CPT

## 2025-06-10 PROCEDURE — 80061 LIPID PANEL: CPT

## 2025-06-11 LAB
ALBUMIN SERPL BCP-MCNC: 4.7 G/DL (ref 3.2–4.9)
ALBUMIN/GLOB SERPL: 2 G/DL
ALP SERPL-CCNC: 102 U/L (ref 30–99)
ALT SERPL-CCNC: 56 U/L (ref 2–50)
ANION GAP SERPL CALC-SCNC: 12 MMOL/L (ref 7–16)
AST SERPL-CCNC: 31 U/L (ref 12–45)
BILIRUB SERPL-MCNC: 0.7 MG/DL (ref 0.1–1.5)
BUN SERPL-MCNC: 13 MG/DL (ref 8–22)
CALCIUM ALBUM COR SERPL-MCNC: 9 MG/DL (ref 8.5–10.5)
CALCIUM SERPL-MCNC: 9.6 MG/DL (ref 8.5–10.5)
CHLORIDE SERPL-SCNC: 104 MMOL/L (ref 96–112)
CHOLEST SERPL-MCNC: 230 MG/DL (ref 100–199)
CO2 SERPL-SCNC: 24 MMOL/L (ref 20–33)
CREAT SERPL-MCNC: 1.29 MG/DL (ref 0.5–1.4)
GFR SERPLBLD CREATININE-BSD FMLA CKD-EPI: 74 ML/MIN/1.73 M 2
GLOBULIN SER CALC-MCNC: 2.4 G/DL (ref 1.9–3.5)
GLUCOSE SERPL-MCNC: 92 MG/DL (ref 65–99)
HDLC SERPL-MCNC: 46 MG/DL
LDLC SERPL CALC-MCNC: 146 MG/DL
POTASSIUM SERPL-SCNC: 4.1 MMOL/L (ref 3.6–5.5)
PROT SERPL-MCNC: 7.1 G/DL (ref 6–8.2)
SODIUM SERPL-SCNC: 140 MMOL/L (ref 135–145)
T4 FREE SERPL-MCNC: 1.43 NG/DL (ref 0.93–1.7)
TRIGL SERPL-MCNC: 188 MG/DL (ref 0–149)
TSH SERPL-ACNC: 2.97 UIU/ML (ref 0.38–5.33)